# Patient Record
Sex: FEMALE | Race: WHITE
[De-identification: names, ages, dates, MRNs, and addresses within clinical notes are randomized per-mention and may not be internally consistent; named-entity substitution may affect disease eponyms.]

---

## 2018-06-20 ENCOUNTER — HOSPITAL ENCOUNTER (INPATIENT)
Dept: HOSPITAL 92 - L&D/OP | Age: 28
LOS: 2 days | Discharge: HOME | End: 2018-06-22
Attending: OBSTETRICS & GYNECOLOGY | Admitting: OBSTETRICS & GYNECOLOGY
Payer: COMMERCIAL

## 2018-06-20 VITALS — BODY MASS INDEX: 30.5 KG/M2

## 2018-06-20 DIAGNOSIS — O48.0: Primary | ICD-10-CM

## 2018-06-20 DIAGNOSIS — G40.909: ICD-10-CM

## 2018-06-20 DIAGNOSIS — Z3A.40: ICD-10-CM

## 2018-06-20 LAB
HBSAG INDEX: 0.14 S/CO (ref 0–0.99)
HGB BLD-MCNC: 12.4 G/DL (ref 12–16)
HIV 1/2 INDEX: 0.08 S/CO (ref ?–1)
MCH RBC QN AUTO: 33 PG (ref 27–31)
MCV RBC AUTO: 93.7 FL (ref 78–98)
PLATELET # BLD AUTO: 197 THOU/UL (ref 130–400)
RBC # BLD AUTO: 3.74 MILL/UL (ref 4.2–5.4)
SYPHILIS ANTIBODY INDEX: 0.07 S/CO
WBC # BLD AUTO: 8 THOU/UL (ref 4.8–10.8)

## 2018-06-20 PROCEDURE — 86850 RBC ANTIBODY SCREEN: CPT

## 2018-06-20 PROCEDURE — 86780 TREPONEMA PALLIDUM: CPT

## 2018-06-20 PROCEDURE — 85027 COMPLETE CBC AUTOMATED: CPT

## 2018-06-20 PROCEDURE — 36415 COLL VENOUS BLD VENIPUNCTURE: CPT

## 2018-06-20 PROCEDURE — 87340 HEPATITIS B SURFACE AG IA: CPT

## 2018-06-20 PROCEDURE — 87389 HIV-1 AG W/HIV-1&-2 AB AG IA: CPT

## 2018-06-20 PROCEDURE — 51702 INSERT TEMP BLADDER CATH: CPT

## 2018-06-20 PROCEDURE — 86900 BLOOD TYPING SEROLOGIC ABO: CPT

## 2018-06-20 PROCEDURE — 86901 BLOOD TYPING SEROLOGIC RH(D): CPT

## 2018-06-20 PROCEDURE — 99285 EMERGENCY DEPT VISIT HI MDM: CPT

## 2018-06-20 NOTE — PDOC.LDHP
Labor and Delivery H&P


Chief complaint: loss of fluid


HPI: 


Seen at 1900:





Patient of Dr Moscoso, here for possible SROM at 40 weeks. The patient is a 27 yo 

 with 2 prior SVDs here for leakage of fluid since this PM...first nadine=nathan 

followed by a gusmaria luisa. Good FM, no fevers reported, no HAs.





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 

sports head concussion in High School. No Neurological issues. About 2 weeks 

ago she had a possible absence sz vs vagal episode but not worked up. (HX 

sounds more like absence).





Review of Systems: complete ROS done and otherwise negative as per HPI


Current gestational age (weeks): 40


Due date: 18


Grav: 4


Para: 2


OB History Details: 





 x2, past epidural use without issues


Current pregnancy complications: none


Abnormal US findings: No


Current medications: pre-hannah vitamins


Previous surgical history: none


Allergies/Adverse Reactions: 


 Allergies











Allergy/AdvReac Type Severity Reaction Status Date / Time


 


Sulfa (Sulfonamide Allergy   Verified 11/02/15 16:57





Antibiotics)     


 


sour cream Allergy   Uncoded 11/02/15 17:34











Social history: none





- Physical Exam


Vital signs reviewed and normal: yes


Abnormal vital signs: TMax 99


General: NAD


Heart: RRR


Lungs: CTAB


Abdomen: gravid


Extremeties: no edema


FHT: category 1


Boyd contractions every: irregular and few





- Vaginal Exam


cm dilated: 1 (posterior cervix. Sterile speculum exam performed y me and 

grossly ruptured clear fluid pooling in posterior vag wall)


Effacement: 25%


Station: -1





- Assessment


L&D Assessment: term rupture in membranes (PROM)





- Plan


Plan: admit to L&D, labor augmentation if indicated (I have discussed admit 

with Dr Moscoso. I will order cytotec PV for ripening as cx thick and posterior. 

Presentation cephalic by leopolds.), informed consent obtained, anesthesia 

consult for pain management

## 2018-06-21 RX ADMIN — DOCUSATE CALCIUM SCH MG: 240 CAPSULE, LIQUID FILLED ORAL at 09:12

## 2018-06-21 RX ADMIN — DOCUSATE CALCIUM SCH MG: 240 CAPSULE, LIQUID FILLED ORAL at 23:43

## 2018-06-21 RX ADMIN — ACETAMINOPHEN AND CODEINE PHOSPHATE PRN TAB: 300; 30 TABLET ORAL at 23:42

## 2018-06-21 NOTE — PDOC.OPDEL
OB Operative/Delivery Note


Delivery Dr/Surgeon: Danis


Pre-Delivery Diagnosis: active labor


Procedure/Post Delivery Dx: spontaneous vaginal delivery


Weeks gestation: 40


Anesthesia: epidural





- Findings


  ** A


Sex: female


Apgar - 1 min: 9


Apgar - 5 min: 9





- Additional Findings/Plan


Placenta delivered: spontaneous


Post delivery plan: routine recovery

## 2018-06-22 VITALS — SYSTOLIC BLOOD PRESSURE: 125 MMHG | TEMPERATURE: 97.8 F | DIASTOLIC BLOOD PRESSURE: 62 MMHG

## 2018-06-22 RX ADMIN — Medication SCH: at 06:23

## 2018-06-22 RX ADMIN — ACETAMINOPHEN AND CODEINE PHOSPHATE PRN TAB: 300; 30 TABLET ORAL at 08:02

## 2018-06-22 RX ADMIN — Medication SCH: at 10:27

## 2018-06-22 RX ADMIN — Medication SCH: at 07:35

## 2018-06-22 RX ADMIN — DOCUSATE CALCIUM SCH MG: 240 CAPSULE, LIQUID FILLED ORAL at 08:02

## 2018-06-22 NOTE — PDOC.PP
Post Partum Progress Note


Post Partum Day #: 1


Subjective: 


nursing well, very sore feeling, no fever/chills, min lochia


PO intake tolerated: yes


Flatus: yes


Ambulation: yes


 Vital Signs (12 hours)











  Temp Pulse Resp BP


 


 18 04:00  98.5 F  63  18 


 


 18 00:00  98.5 F  63  18 


 


 18 21:00  98.5 F  63  18  106/55 L








 Weight











Weight                         195 lb

















- Physical Examination


General: NAD


Respiratory: non-labored breathing


Abdominal: no distention


Fundus firm & at: below umb


Extremities: negative homans (B)


Neurological: no gross focal deficits


Psychiatric: A&Ox3, normal affect


Result Diagrams: 


 18 19:49





Additional Labs: 


 Post Partum Labs











Blood Type  A POSITIVE   18  19:49    


 


Hep Bs Antigen  Non-Reactive S/CO (NonReactive)   18  19:50    











(1) 40 weeks gestation of pregnancy


Code(s): Z3A.40 - 40 WEEKS GESTATION OF PREGNANCY   Status: Acute   





(2) Vaginal delivery


Code(s): O80 - ENCOUNTER FOR FULL-TERM UNCOMPLICATED DELIVERY   Status: Acute   





- Assessment/Plan





PPD1 sp , likely DC home today if baby DC.